# Patient Record
Sex: FEMALE | URBAN - METROPOLITAN AREA
[De-identification: names, ages, dates, MRNs, and addresses within clinical notes are randomized per-mention and may not be internally consistent; named-entity substitution may affect disease eponyms.]

---

## 2018-09-13 ENCOUNTER — APPOINTMENT (OUTPATIENT)
Dept: URBAN - METROPOLITAN AREA CLINIC 236 | Age: 73
Setting detail: DERMATOLOGY
End: 2018-09-13

## 2018-09-13 DIAGNOSIS — Z41.9 ENCOUNTER FOR PROCEDURE FOR PURPOSES OTHER THAN REMEDYING HEALTH STATE, UNSPECIFIED: ICD-10-CM

## 2018-09-13 PROCEDURE — OTHER LASER HAIR REMOVAL: OTHER

## 2018-09-13 NOTE — PROCEDURE: LASER HAIR REMOVAL
Location Override: corners of mouth
Render Post-Care In The Note: No
Number Of Prepaid Treatments (Will Not Render If 0): 0
Fluence (Will Not Render If 0): 20
Consent: Written consent obtained, risks reviewed including but not limited to crusting, scabbing, blistering, scarring, darker or lighter pigmentary change, paradoxical hair regrowth, incomplete removal of hair and infection.
Shaving (Optional): The patient shaved at home
Pulse Duration: 30 ms
Fluence (Will Not Render If 0): 22
Laser Type: diode 810nm
Post-Care Instructions: I reviewed with the patient in detail post-care instructions. Patient should avoid sun for a minimum of 4 weeks before and after treatment.
Post-Procedure Care: Immediate endpoint: perifollicular erythema and edema. Vaseline and ice applied. Post care reviewed with patient.
Pre-Procedure: Prior to proceeding the treatment areas were cleaned and all present put on their eye protection.
Price (Use Numbers Only, No Special Characters Or $): 50.00
Detail Level: Detailed
Tolerated Procedure (Optional): Tolerated Well

## 2018-12-04 ENCOUNTER — APPOINTMENT (OUTPATIENT)
Dept: URBAN - METROPOLITAN AREA CLINIC 236 | Age: 73
Setting detail: DERMATOLOGY
End: 2018-12-04

## 2018-12-04 DIAGNOSIS — Z41.9 ENCOUNTER FOR PROCEDURE FOR PURPOSES OTHER THAN REMEDYING HEALTH STATE, UNSPECIFIED: ICD-10-CM

## 2018-12-04 PROCEDURE — OTHER LASER HAIR REMOVAL: OTHER

## 2018-12-04 NOTE — PROCEDURE: LASER HAIR REMOVAL
Treatment Number: 0
Price (Use Numbers Only, No Special Characters Or $): 50.00
Pulse Duration: 30 ms
Fluence (Will Not Render If 0): 20
Detail Level: Detailed
Cooling: chill tip
Render Post-Care In The Note: No
Laser Type: diode 810nm

## 2019-01-15 ENCOUNTER — APPOINTMENT (OUTPATIENT)
Dept: URBAN - METROPOLITAN AREA CLINIC 236 | Age: 74
Setting detail: DERMATOLOGY
End: 2019-01-16

## 2019-01-15 DIAGNOSIS — Z41.9 ENCOUNTER FOR PROCEDURE FOR PURPOSES OTHER THAN REMEDYING HEALTH STATE, UNSPECIFIED: ICD-10-CM

## 2019-01-15 PROCEDURE — OTHER LASER HAIR REMOVAL: OTHER

## 2019-01-15 NOTE — PROCEDURE: LASER HAIR REMOVAL
Treatment Number: 0
Cooling: chill tip
Fluence (Will Not Render If 0): 20
Render Post-Care In The Note: No
Laser Type: diode 810nm
Detail Level: Simple
Pulse Duration: 30 ms
Fluence (Will Not Render If 0): 22
Price (Use Numbers Only, No Special Characters Or $): 50.00

## 2019-02-21 ENCOUNTER — APPOINTMENT (OUTPATIENT)
Dept: URBAN - METROPOLITAN AREA CLINIC 236 | Age: 74
Setting detail: DERMATOLOGY
End: 2019-02-21

## 2019-02-21 DIAGNOSIS — Z41.9 ENCOUNTER FOR PROCEDURE FOR PURPOSES OTHER THAN REMEDYING HEALTH STATE, UNSPECIFIED: ICD-10-CM

## 2019-02-21 PROCEDURE — OTHER LASER HAIR REMOVAL: OTHER

## 2019-02-21 NOTE — PROCEDURE: LASER HAIR REMOVAL
Total Pulses: 11
Pulse Duration: 3 ms
Number Of Prepaid Treatments (Will Not Render If 0): 0
Fluence (Will Not Render If 0): 22
Price (Use Numbers Only, No Special Characters Or $): 50.00
Fluence (Will Not Render If 0): 20
Cooling: DCD 30/20
Render Post-Care In The Note: No
Laser Type: Alexandrite 755nm
Spot Size: 12 mm
Detail Level: Simple
Fluence (Will Not Render If 0): 16

## 2019-04-04 ENCOUNTER — APPOINTMENT (OUTPATIENT)
Dept: URBAN - METROPOLITAN AREA CLINIC 236 | Age: 74
Setting detail: DERMATOLOGY
End: 2019-04-04

## 2019-04-04 DIAGNOSIS — Z41.9 ENCOUNTER FOR PROCEDURE FOR PURPOSES OTHER THAN REMEDYING HEALTH STATE, UNSPECIFIED: ICD-10-CM

## 2019-04-04 PROCEDURE — OTHER LASER HAIR REMOVAL: OTHER

## 2019-04-04 NOTE — PROCEDURE: LASER HAIR REMOVAL
Price (Use Numbers Only, No Special Characters Or $): 50.00
Number Of Prepaid Treatments (Will Not Render If 0): 0
Cooling: DCD 30/20
Total Pulses: 11
Pulse Duration: 3 ms
Spot Size: 12 mm
Render Post-Care In The Note: No
Fluence (Will Not Render If 0): 16
Detail Level: Simple
Cooling: DCD setting
Cooling Override: 20/10
Fluence (Will Not Render If 0): 20
Laser Type: Alexandrite 755nm

## 2019-05-16 ENCOUNTER — APPOINTMENT (OUTPATIENT)
Dept: URBAN - METROPOLITAN AREA CLINIC 236 | Age: 74
Setting detail: DERMATOLOGY
End: 2019-05-16

## 2019-05-16 DIAGNOSIS — Z41.9 ENCOUNTER FOR PROCEDURE FOR PURPOSES OTHER THAN REMEDYING HEALTH STATE, UNSPECIFIED: ICD-10-CM

## 2019-05-16 PROCEDURE — OTHER LASER HAIR REMOVAL: OTHER

## 2019-05-16 NOTE — PROCEDURE: LASER HAIR REMOVAL
Cooling: DCD setting
Number Of Prepaid Treatments (Will Not Render If 0): 0
Spot Size: 12 mm
Cooling Override: 20/10
Detail Level: Simple
Laser Type: Alexandrite 755nm
Pulse Duration: 3 ms
Price (Use Numbers Only, No Special Characters Or $): 50.00
Fluence (Will Not Render If 0): 20
Cooling: DCD 30/20
Render Post-Care In The Note: No

## 2019-06-20 ENCOUNTER — APPOINTMENT (OUTPATIENT)
Dept: URBAN - METROPOLITAN AREA CLINIC 236 | Age: 74
Setting detail: DERMATOLOGY
End: 2019-06-20

## 2019-06-20 DIAGNOSIS — Z41.9 ENCOUNTER FOR PROCEDURE FOR PURPOSES OTHER THAN REMEDYING HEALTH STATE, UNSPECIFIED: ICD-10-CM

## 2019-06-20 PROCEDURE — OTHER LASER HAIR REMOVAL: OTHER

## 2019-06-20 NOTE — PROCEDURE: LASER HAIR REMOVAL
Fluence (Will Not Render If 0): 20
Fluence (Will Not Render If 0): 18
Treatment Number: 0
Pulse Duration: 10 ms
Cooling: DCD 30/20
Pulse Duration: 3 ms
Render Post-Care In The Note: No
Comments: Quote $50.00 per treatment
Cooling: DCD setting
Cooling Override: 20/10
Spot Size: 12 mm
Price (Use Numbers Only, No Special Characters Or $): 50.00
Detail Level: Simple
Laser Type: Nd:Yag 1064nm

## 2019-07-31 ENCOUNTER — APPOINTMENT (OUTPATIENT)
Dept: URBAN - METROPOLITAN AREA CLINIC 236 | Age: 74
Setting detail: DERMATOLOGY
End: 2019-07-31

## 2019-07-31 DIAGNOSIS — Z41.9 ENCOUNTER FOR PROCEDURE FOR PURPOSES OTHER THAN REMEDYING HEALTH STATE, UNSPECIFIED: ICD-10-CM

## 2019-07-31 PROCEDURE — OTHER LASER HAIR REMOVAL: OTHER

## 2019-07-31 NOTE — PROCEDURE: LASER HAIR REMOVAL
Render Post-Care In The Note: No
Laser Type: Alexandrite 755nm
Cooling Override: 20/10
Spot Size: 12 mm
Number Of Prepaid Treatments (Will Not Render If 0): 0
Total Pulses: 2
Detail Level: Simple
Comments: Quoted $50 per treatment
Fluence (Will Not Render If 0): 18
Pulse Duration: 3 ms
Cooling: DCD 30/20
Price (Use Numbers Only, No Special Characters Or $): 50.00
Cooling: DCD setting
Fluence (Will Not Render If 0): 20

## 2019-09-12 ENCOUNTER — APPOINTMENT (OUTPATIENT)
Dept: URBAN - METROPOLITAN AREA CLINIC 236 | Age: 74
Setting detail: DERMATOLOGY
End: 2019-09-12

## 2019-09-12 DIAGNOSIS — Z41.9 ENCOUNTER FOR PROCEDURE FOR PURPOSES OTHER THAN REMEDYING HEALTH STATE, UNSPECIFIED: ICD-10-CM

## 2019-09-12 PROCEDURE — OTHER LASER HAIR REMOVAL: OTHER

## 2019-09-12 NOTE — PROCEDURE: LASER HAIR REMOVAL
External Cooling Fan Speed: 0
Laser Type: Alexandrite 755nm
Spot Size: 12 mm
Pulse Duration: 3 ms
Fluence (Will Not Render If 0): 20
Cooling Override: 20/10
Detail Level: Simple
Fluence (Will Not Render If 0): 18
Cooling: DCD setting
Cooling: DCD 30/20
Render Post-Care In The Note: No
Total Pulses: 2
Price (Use Numbers Only, No Special Characters Or $): 50.00
Fluence (Will Not Render If 0): 16

## 2019-10-24 ENCOUNTER — APPOINTMENT (OUTPATIENT)
Dept: URBAN - METROPOLITAN AREA CLINIC 236 | Age: 74
Setting detail: DERMATOLOGY
End: 2019-10-24

## 2019-10-24 DIAGNOSIS — Z41.9 ENCOUNTER FOR PROCEDURE FOR PURPOSES OTHER THAN REMEDYING HEALTH STATE, UNSPECIFIED: ICD-10-CM

## 2019-10-24 PROCEDURE — OTHER LASER HAIR REMOVAL: OTHER

## 2019-10-24 NOTE — PROCEDURE: LASER HAIR REMOVAL
Fluence (Will Not Render If 0): 20
Cooling: DCD setting
Spot Size: 12 mm
Number Of Prepaid Treatments (Will Not Render If 0): 0
Fluence (Will Not Render If 0): 18
Cooling Override: 20/10
Laser Type: Alexandrite 755nm
Render Post-Care In The Note: No
Cooling: DCD 30/20
Pulse Duration: 3 ms
Price (Use Numbers Only, No Special Characters Or $): 50.00
Total Pulses: 2
Detail Level: Simple

## 2019-12-03 ENCOUNTER — APPOINTMENT (OUTPATIENT)
Dept: URBAN - METROPOLITAN AREA CLINIC 236 | Age: 74
Setting detail: DERMATOLOGY
End: 2019-12-03

## 2019-12-03 DIAGNOSIS — Z41.9 ENCOUNTER FOR PROCEDURE FOR PURPOSES OTHER THAN REMEDYING HEALTH STATE, UNSPECIFIED: ICD-10-CM

## 2019-12-03 PROCEDURE — OTHER LASER HAIR REMOVAL: OTHER

## 2019-12-03 NOTE — PROCEDURE: LASER HAIR REMOVAL
Number Of Prepaid Treatments (Will Not Render If 0): 0
Fluence (Will Not Render If 0): 20
Cooling: DCD setting
Cooling: DCD 30/20
Laser Type: Alexandrite 755nm
Fluence (Will Not Render If 0): 18
Detail Level: Simple
Price (Use Numbers Only, No Special Characters Or $): 50.00
Spot Size: 18 mm
Spot Size: 15 mm
Pulse Duration: 3 ms
Spot Size: 12 mm
Render Post-Care In The Note: No
Cooling Override: 20/10
Fluence (Will Not Render If 0): 10
Fluence (Will Not Render If 0): 16

## 2020-01-14 ENCOUNTER — APPOINTMENT (OUTPATIENT)
Dept: URBAN - METROPOLITAN AREA CLINIC 236 | Age: 75
Setting detail: DERMATOLOGY
End: 2020-01-14

## 2020-01-14 DIAGNOSIS — Z41.9 ENCOUNTER FOR PROCEDURE FOR PURPOSES OTHER THAN REMEDYING HEALTH STATE, UNSPECIFIED: ICD-10-CM

## 2020-01-14 PROCEDURE — OTHER LASER HAIR REMOVAL: OTHER

## 2020-01-14 NOTE — PROCEDURE: LASER HAIR REMOVAL
External Cooling Fan Speed: 0
Cooling Override: 20/10
Fluence (Will Not Render If 0): 10
Spot Size: 12 mm
Pulse Duration: 3 ms
Cooling: DCD setting
Spot Size: 15 mm
Spot Size: 18 mm
Laser Type: Alexandrite 755nm
Cooling: DCD 30/20
Render Post-Care In The Note: No
Price (Use Numbers Only, No Special Characters Or $): 50.00
Fluence (Will Not Render If 0): 22
Detail Level: Simple
Fluence (Will Not Render If 0): 16
Fluence (Will Not Render If 0): 20

## 2020-02-25 ENCOUNTER — APPOINTMENT (OUTPATIENT)
Dept: URBAN - METROPOLITAN AREA CLINIC 236 | Age: 75
Setting detail: DERMATOLOGY
End: 2020-02-25

## 2020-02-25 DIAGNOSIS — Z41.9 ENCOUNTER FOR PROCEDURE FOR PURPOSES OTHER THAN REMEDYING HEALTH STATE, UNSPECIFIED: ICD-10-CM

## 2020-02-25 PROCEDURE — OTHER LASER HAIR REMOVAL: OTHER

## 2020-02-25 NOTE — PROCEDURE: LASER HAIR REMOVAL
Treatment Number: 0
Pulse Duration: 3 ms
Fluence (Will Not Render If 0): 16
Spot Size: 12 mm
Spot Size: 15 mm
Price (Use Numbers Only, No Special Characters Or $): 50.00
Spot Size: 18 mm
Cooling: DCD 30/20
Laser Type: Alexandrite 755nm
Fluence (Will Not Render If 0): 22
Detail Level: Simple
Render Post-Care In The Note: No
Cooling Override: 20/10
Cooling: DCD setting
Fluence (Will Not Render If 0): 10
Fluence (Will Not Render If 0): 20

## 2020-07-01 ENCOUNTER — APPOINTMENT (OUTPATIENT)
Dept: URBAN - METROPOLITAN AREA CLINIC 236 | Age: 75
Setting detail: DERMATOLOGY
End: 2020-07-01

## 2020-07-01 DIAGNOSIS — Z41.9 ENCOUNTER FOR PROCEDURE FOR PURPOSES OTHER THAN REMEDYING HEALTH STATE, UNSPECIFIED: ICD-10-CM

## 2020-07-01 PROCEDURE — OTHER LASER HAIR REMOVAL: OTHER

## 2020-07-01 NOTE — PROCEDURE: LASER HAIR REMOVAL
Fluence (Will Not Render If 0): 16
External Cooling Fan Speed: 0
Render Post-Care In The Note: No
Cooling: DCD setting
Detail Level: Simple
Spot Size: 15 mm
Eye Shield Text: Given the treatment area eye shields were inserted prior to treatment.
Cooling: DCD 30/20
Pulse Duration: 3 ms
Fluence (Will Not Render If 0): 20
Price (Use Numbers Only, No Special Characters Or $): 50.00
Fluence (Will Not Render If 0): 10
Spot Size: 18 mm
Laser Type: Alexandrite 755nm
Spot Size: 12 mm
Cooling Override: 20/10